# Patient Record
Sex: MALE
[De-identification: names, ages, dates, MRNs, and addresses within clinical notes are randomized per-mention and may not be internally consistent; named-entity substitution may affect disease eponyms.]

---

## 2024-02-20 PROBLEM — Z00.00 ENCOUNTER FOR PREVENTIVE HEALTH EXAMINATION: Status: ACTIVE | Noted: 2024-02-20

## 2024-02-22 NOTE — REASON FOR VISIT
[Home] : at home, [unfilled] , at the time of the educational consult. [Medical Office: ([facility])___] : at the medical office located in  [Participant] : the participant [Self] : self [New Patient Visit] : a new patient visit

## 2024-02-27 ENCOUNTER — APPOINTMENT (OUTPATIENT)
Dept: NEUROSURGERY | Facility: CLINIC | Age: 54
End: 2024-02-27
Payer: SELF-PAY

## 2024-02-27 DIAGNOSIS — H93.A1 PULSATILE TINNITUS, RIGHT EAR: ICD-10-CM

## 2024-02-27 PROCEDURE — EDU01: CPT

## 2024-03-08 NOTE — HISTORY OF PRESENT ILLNESS
[de-identified] : VENTURA VALENZUELA is a 53 year old male pre 9/2023 incidents of noise now diagnosed with pulsatile tinnitus sounded like water pipes in the RIght ear lasting 10-20 minutes. After dental procedure felt intense pounding in the head.  Since then it is more frequent, not constant, especially at night and when preparing to sleep.   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  Started amlodipine 3 months ago, and concerned that this may have contributed to his prolonged tinnitus since then. Now Left ear whistiling sound along with initial Right ear pulsating whooshing sound Taking B12, ferritin, and terrine ?

## 2024-03-08 NOTE — ASSESSMENT
[FreeTextEntry1] : IMPRESSION: 53M with Right sided pulsatile tinnitus over 1.5 years synchronized with heartbeat. Does not notice any change with exercise.   Explained that the abnormal vascular loop is not the cause of his pulsatile tinnitus, and I do not recommend surgical intervention for this.  Imaging in [facility] reviewed iimages today with patient via TEB/VEC MRI IAC 10/2022 - unremarkale MRA Neck 12/2023 - unremarkable MRV also unremarkable with anatomical variation of dominant veins on Left  CTA head/neck [id number] shows dominant Left transverse sinus without stenosis, No jugular vein stenosis, No Extracranial arterial stenosis, No Intracranial stenosis, and No vascular malformation.  I do not believe this is consistent with pulsatile tinnitus since the sound does not fluctuate with strenuous activity. No alarming findings seen on appropriate imaging ENT evaluation since if there is a problem with the hearing nerve this can contribute to common tinnitus pt reports hearing test performed after dental procedure which noted mild to moderate hearing loss   PLAN: May consult with ENT specialist (neuro otologist) for possible muscle relaxants and discuss symptoms not a candidate for surgical intervention from my perspective PT for possible postural exercises